# Patient Record
(demographics unavailable — no encounter records)

---

## 2025-01-07 NOTE — HISTORY OF PRESENT ILLNESS
[FreeTextEntry1] : Here for CPE [de-identified] : Her  has been very ill and frequently hospitalized with pancytopenia of unknown known cause She has been spending much of her time at doctors visits and in the hospital  c/o on and off heartburn - very painful; drinks water or chews gum; lasts about 20 seconds Can occur several times a day H/o peptic ulcer disease  On no daily medication other than Zyrtec ((which she takes every other day) Has seen dermatologist No rash  Takes Fioricet for migraines, had been on Botox - but stopped due to time constraints Migraines occur every 1-2 weeks

## 2025-01-07 NOTE — HEALTH RISK ASSESSMENT
[Good] : ~his/her~  mood as  good [No] : No [No falls in past year] : Patient reported no falls in the past year [0] : 2) Feeling down, depressed, or hopeless: Not at all (0) [PHQ-2 Negative - No further assessment needed] : PHQ-2 Negative - No further assessment needed [Never] : Never [With Significant Other] : lives with significant other [# Of Children ___] : has [unfilled] children [Fully functional (bathing, dressing, toileting, transferring, walking, feeding)] : Fully functional (bathing, dressing, toileting, transferring, walking, feeding) [Fully functional (using the telephone, shopping, preparing meals, housekeeping, doing laundry, using] : Fully functional and needs no help or supervision to perform IADLs (using the telephone, shopping, preparing meals, housekeeping, doing laundry, using transportation, managing medications and managing finances) [Reports normal functional visual acuity (ie: able to read med bottle)] : Reports normal functional visual acuity [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

## 2025-01-07 NOTE — ASSESSMENT
[FreeTextEntry1] : Migraines - Manages with Fioricet  GERD - Frequent and daily; try taking omeprazole 40 mg daily Avoid triggers When her schedule is easier, can proceed with EGD to evaluate upper GI tract   HLD - Her total cholesterol is in the 280s, with a very high HDL (in the 90s) and her LDL is high (170s) check calcium score, r/o CAD; May consider statin  hcm DEXA